# Patient Record
Sex: FEMALE | Race: WHITE | NOT HISPANIC OR LATINO | Employment: FULL TIME | URBAN - METROPOLITAN AREA
[De-identification: names, ages, dates, MRNs, and addresses within clinical notes are randomized per-mention and may not be internally consistent; named-entity substitution may affect disease eponyms.]

---

## 2017-03-02 ENCOUNTER — ALLSCRIPTS OFFICE VISIT (OUTPATIENT)
Dept: OTHER | Facility: OTHER | Age: 52
End: 2017-03-02

## 2017-05-01 ENCOUNTER — ALLSCRIPTS OFFICE VISIT (OUTPATIENT)
Dept: OTHER | Facility: OTHER | Age: 52
End: 2017-05-01

## 2017-05-04 LAB
ADDITIONAL INFORMATION (HISTORICAL): NORMAL
DIAGNOSIS (HISTORICAL): NORMAL
PATHOLOGIST (HISTORICAL): NORMAL
PROCEDURE (HISTORICAL): NORMAL
SITE/SOURCE (HISTORICAL): NORMAL

## 2017-05-05 ENCOUNTER — GENERIC CONVERSION - ENCOUNTER (OUTPATIENT)
Dept: OTHER | Facility: OTHER | Age: 52
End: 2017-05-05

## 2017-09-28 ENCOUNTER — GENERIC CONVERSION - ENCOUNTER (OUTPATIENT)
Dept: OTHER | Facility: OTHER | Age: 52
End: 2017-09-28

## 2018-01-13 VITALS
SYSTOLIC BLOOD PRESSURE: 120 MMHG | BODY MASS INDEX: 31.86 KG/M2 | HEIGHT: 67 IN | WEIGHT: 203 LBS | DIASTOLIC BLOOD PRESSURE: 80 MMHG

## 2018-01-14 VITALS
SYSTOLIC BLOOD PRESSURE: 114 MMHG | DIASTOLIC BLOOD PRESSURE: 80 MMHG | WEIGHT: 200 LBS | BODY MASS INDEX: 31.39 KG/M2 | HEIGHT: 67 IN

## 2018-01-15 NOTE — RESULT NOTES
Verified Results  (Q) TISSUE PATHOLOGY [id number] 12:00AM Brianna العراقي     Test Name Result Flag Reference   CLINICAL INFORMATION      54year-old white female with vulvar pruritis   inadequately responsive to 2 5% hydrocortisone  Donnell Davison MD, (electronic signature)  Boarded in Anatomic and Clinical Pathology  Cytopathology   A SOURCE      Vulva anterior right labia majus, medial aspect   A PROCEDURE      None given   A GROSS DESCRIPTION      Vulva anterior right labium majus medial aspect  In formalin  Consists of a fragment of tan gray   skin appearing tissue; 0 5 x 0 3 x 0 1 cm  The   margins cannot definitively be identified  All 1   in 1  Gross exam(s) performed at: [address] [facility] [address], [address] [place] [id number]    : Faraz Huynh MD PHD   A DIAGNOSIS      - MILD CHRONIC VULVITIS WITH FOCAL FEATURES   CONSISTENT WITH LICHEN [address] [facility],  [id number]  COMMENT:   SPECIAL STAIN DOES NOT SHOW FUNGAL ORGANISMS

## 2019-03-26 ENCOUNTER — ANNUAL EXAM (OUTPATIENT)
Dept: OBGYN CLINIC | Facility: CLINIC | Age: 54
End: 2019-03-26
Payer: COMMERCIAL

## 2019-03-26 VITALS
HEIGHT: 68 IN | BODY MASS INDEX: 30.01 KG/M2 | DIASTOLIC BLOOD PRESSURE: 62 MMHG | WEIGHT: 198 LBS | SYSTOLIC BLOOD PRESSURE: 114 MMHG

## 2019-03-26 DIAGNOSIS — N94.9 VAGINAL BURNING: ICD-10-CM

## 2019-03-26 DIAGNOSIS — Z12.31 VISIT FOR SCREENING MAMMOGRAM: ICD-10-CM

## 2019-03-26 DIAGNOSIS — Z12.11 COLON CANCER SCREENING: ICD-10-CM

## 2019-03-26 DIAGNOSIS — Z01.419 ENCOUNTER FOR ANNUAL ROUTINE GYNECOLOGICAL EXAMINATION: Primary | ICD-10-CM

## 2019-03-26 DIAGNOSIS — N94.89 VULVAR BURNING: ICD-10-CM

## 2019-03-26 DIAGNOSIS — E66.9 OBESITY (BMI 30.0-34.9): ICD-10-CM

## 2019-03-26 PROBLEM — E66.811 OBESITY (BMI 30.0-34.9): Status: ACTIVE | Noted: 2019-03-26

## 2019-03-26 LAB — SL AMB POCT WET MOUNT: NORMAL

## 2019-03-26 PROCEDURE — S0612 ANNUAL GYNECOLOGICAL EXAMINA: HCPCS | Performed by: OBSTETRICS & GYNECOLOGY

## 2019-03-26 PROCEDURE — 87210 SMEAR WET MOUNT SALINE/INK: CPT | Performed by: OBSTETRICS & GYNECOLOGY

## 2019-03-26 RX ORDER — BUPROPION HYDROCHLORIDE 300 MG/1
300 TABLET ORAL
COMMUNITY

## 2019-03-26 NOTE — PROGRESS NOTES
Pt is a52 y o  [id number] ,menopausal, who presents for preventive care  Partner same ; lifetime  >5 partners         safe sexual practices followed:     yes   does feel safe in relationship: yes  Pt reports burning with intercourse and external itching for the last several weeks--concerned she has an infection  Reports she uses poise pads for urinary leakage and has to take off her underwear at night to feel better  Dairy: 2 glasses almond milk/day, multivitamin daily  Vitamin D: in multvitamin  Exercise: 3-4x/week  Colonoscopy: last 2 years ago, wnl, hemoccult given  Mammogram: 10/2018--wnl, rx for 2019 given  Pap: not indicated--benign hyst  DEXA: not indicated  safety at home: yes  tobacco use N  Past Medical History:   Diagnosis Date    Depression     Migraines     Rectocele 2015    Last Assessment & Plan:  Mild rectocele defect noted on rectovaginal exam during valsalva  Expectant management for now, patient agrees  Encouraged Kegel exercises, dietary and behavioral modification strategies, occasional splinting/manual support of perineum is ok in order to avoid the more worrisome excessive straining with bowel movements  Patient understands and agrees         Past Surgical History:   Procedure Laterality Date    COLONOSCOPY  2017    wnl, repeat    HYSTERECTOMY      MD CYSTOURETHROSCOPY N/A 10/31/2016    Procedure: CYSTOSCOPY;  Surgeon: Will Flores MD;  [facility];  Service: UroGynecology          [address] N/A 10/31/2016    Procedure: Ricky Adrian; BIOLOGICAL GRAFT;  Surgeon: Will Flores MD;  [facility];  Service: UroGynecology           MD SLING OPER STRES INCONTINENCE N/A 10/31/2016    Procedure: SINGLE INCISION SLING;  Surgeon: Will Flores MD;  [facility];  Service: UroGynecology           PUBOVAGINAL SLING      REPAIR RECTOCELE         Ob Hx:   OB History    Para Term  AB Living   2 2 2     2   SAB TAB Ectopic Multiple Live Births                  # Outcome Date GA Lbr Kyle/2nd Weight Sex Delivery Anes PTL Lv   2 Term            1 Term               Obstetric Comments   Menarche at 8years old  Current Outpatient Medications:     Omega-3 Fatty Acids (FISH OIL) 875 MG CAPS, , Disp: , Rfl:     b complex vitamins tablet, Take 1 tablet by mouth daily, Disp: , Rfl:     buPROPion (WELLBUTRIN XL) 300 mg 24 hr tablet, Take 300 mg by mouth, Disp: , Rfl:     hydrocortisone 2 5 % cream, Apply topically 2 (two) times a day, Disp: 30 g, Rfl: 0    Multiple Vitamin (MULTIVITAMIN) tablet, Take 1 tablet by mouth daily, Disp: , Rfl:     No Known Allergies    Social History     Socioeconomic History    Marital status: /Civil Union     Spouse name: None    Number of children: 2    Years of education: None    Highest education level: Some college, no degree   Occupational History    None   Social Needs    Financial resource strain: None    Food insecurity:     Worry: None     Inability: None    Transportation needs:     Medical: None     Non-medical: None   Tobacco Use    Smoking status: Never Smoker    Smokeless tobacco: Never Used   Substance and Sexual Activity    Alcohol use: Yes     Alcohol/week: 0 6 - 1 2 oz     Types: 1 - 2 Shots of liquor per week     Frequency: Monthly or less     Drinks per session: 1 or 2     Binge frequency: Never    Drug use: No    Sexual activity: Yes     Partners: Male     Birth control/protection: Surgical, Other     Comment: >5 lifetime partners   30 years     Lifestyle    Physical activity:     Days per week: None     Minutes per session: None    Stress: None   Relationships    Social connections:     Talks on phone: None     Gets together: None     Attends [employer] service: None     Active member of club or organization: None     Attends meetings of clubs or organizations: None     Relationship status: None    Intimate partner violence:     Fear of current or ex partner: None     Emotionally abused: None     Physically abused: None     Forced sexual activity: None   Other Topics Concern    None   Social History Narrative    Calcium intake: 2 glass almond milk  No yogurt  No cheese  Multivitamin  Exercise: 4x/wk, 40min-hour  Jewish: Jewish    Accepts blood products  Family History   Problem Relation Age of Onset    Breast cancer Maternal Aunt 61    Breast cancer Maternal Aunt 61    Hyperlipidemia Mother     Stroke Father         s/p traumatic accident    Diabetes Father     Hypertension Father     No Known Problems Sister     Heart attack Brother     Hypertension Brother     No Known Problems Brother     No Known Problems Brother     Colon cancer Neg Hx     Ovarian cancer Neg Hx        Blood pressure 114/62, height 5' 7 5" (1 715 m), weight 89 8 kg (198 lb)  and Body mass index is 30 55 kg/m²  Physical Exam   Constitutional: She is oriented to person, place, and time  She appears well-developed and well-nourished  HENT:   Head: Normocephalic and atraumatic  Eyes: Conjunctivae and EOM are normal    Neck: Normal range of motion  Neck supple  No tracheal deviation present  No thyromegaly present  Cardiovascular: Normal rate, regular rhythm and normal heart sounds  Pulmonary/Chest: Effort normal and breath sounds normal  No stridor  No respiratory distress  She has no wheezes  She has no rales  Abdominal: Soft  Bowel sounds are normal  She exhibits no distension and no mass  There is no tenderness  There is no rebound and no guarding  Musculoskeletal: Normal range of motion  She exhibits no edema or tenderness  Lymphadenopathy:     She has no cervical adenopathy  Neurological: She is alert and oriented to person, place, and time  Skin: Skin is warm  No rash noted  No erythema  Psychiatric: She has a normal mood and affect   Her behavior is normal  Judgment and thought content normal       Breasts: breasts appear normal, no suspicious masses, no skin or nipple changes or axillary nodes  Pelvic exam: VULVA: vulvar excoriation on right side, VAGINA: pink, flattening of rugae, CERVIX: surgically absent, UTERUS: surgically absent, vaginal cuff well healed, ADNEXA: normal adnexa in size, nontender and no masses, RECTAL: normal rectal, no masses, external hemorrhoids non-thrombosed  Wet Mount/KOH Results:  Bacteria: few  Clue cells:  minimal  Trichomonas: absent  Yeast (with or without hyphae): absent  PH: yellow/normal  KOH: negative      A/P:  Pt is a 48 y o  [id number] with       Diagnoses and all orders for this visit:    Encounter for annual routine gynecological examination    Visit for screening mammogram  -     Mammo screening bilateral w 3d & cad; Future    Colon cancer screening  -     Occult Blood, Fecal, IA; Future  -     Occult Blood, Fecal, IA    Obesity (BMI 30 0-34 9)  -advised of recommendation of BMI 19-25    Vulvar burning  -     POCT wet mount    Vaginal burning  -     POCT wet mount    Likely contact dermatitis from continuous pad wear and irritation from the urine in pad  Advised to try organic pads  Hydrocortisone rx given  If no improvement to follow up

## 2020-10-23 DIAGNOSIS — Z12.31 VISIT FOR SCREENING MAMMOGRAM: ICD-10-CM

## 2024-02-21 PROBLEM — Z12.11 COLON CANCER SCREENING: Status: RESOLVED | Noted: 2019-03-26 | Resolved: 2024-02-21

## 2024-02-21 PROBLEM — Z01.419 ENCOUNTER FOR ANNUAL ROUTINE GYNECOLOGICAL EXAMINATION: Status: RESOLVED | Noted: 2019-03-26 | Resolved: 2024-02-21

## 2025-02-20 ENCOUNTER — OFFICE VISIT (OUTPATIENT)
Dept: URGENT CARE | Facility: CLINIC | Age: 60
End: 2025-02-20
Payer: COMMERCIAL

## 2025-02-20 VITALS
DIASTOLIC BLOOD PRESSURE: 67 MMHG | TEMPERATURE: 102.7 F | WEIGHT: 205.8 LBS | BODY MASS INDEX: 32.3 KG/M2 | OXYGEN SATURATION: 97 % | SYSTOLIC BLOOD PRESSURE: 136 MMHG | HEART RATE: 97 BPM | RESPIRATION RATE: 16 BRPM | HEIGHT: 67 IN

## 2025-02-20 DIAGNOSIS — J11.1 FLU: Primary | ICD-10-CM

## 2025-02-20 DIAGNOSIS — R50.9 FEVER, UNSPECIFIED FEVER CAUSE: ICD-10-CM

## 2025-02-20 DIAGNOSIS — R05.1 ACUTE COUGH: ICD-10-CM

## 2025-02-20 PROCEDURE — 87636 SARSCOV2 & INF A&B AMP PRB: CPT

## 2025-02-20 PROCEDURE — 99203 OFFICE O/P NEW LOW 30 MIN: CPT

## 2025-02-20 RX ORDER — OSELTAMIVIR PHOSPHATE 75 MG/1
75 CAPSULE ORAL EVERY 12 HOURS SCHEDULED
Qty: 10 CAPSULE | Refills: 0 | Status: SHIPPED | OUTPATIENT
Start: 2025-02-20 | End: 2025-02-25

## 2025-02-20 RX ORDER — BENZONATATE 200 MG/1
200 CAPSULE ORAL 3 TIMES DAILY PRN
Qty: 20 CAPSULE | Refills: 0 | Status: SHIPPED | OUTPATIENT
Start: 2025-02-20

## 2025-02-20 NOTE — PATIENT INSTRUCTIONS
Saline nasal spray as often as needed or you can use your Stefania pot at home.  Flonase nasal spray 1 spray to each nostril daily  You can take either DayQuil NyQuil, TheraFlu or Mucinex in the blue box  Increase your fluids and rest  Tessalon Perles as needed for cough  Tylenol Motrin for fever or discomfort  Start the Tamiflu today  Your testing will take 24 hours to come back.

## 2025-02-20 NOTE — PROGRESS NOTES
[facility]        NAME: Shiela Boone is a 59 y.o. female  : 1965    MRN: [id number]  DATE: 2025  TIME: 11:52 AM    Assessment and Plan   Flu [J11.1]  1. Flu  oseltamivir (TAMIFLU) 75 mg capsule      2. Fever, unspecified fever cause  Covid/Flu- Office Collect Normal      3. Acute cough  oseltamivir (TAMIFLU) 75 mg capsule    benzonatate (TESSALON) 200 MG capsule            Patient Instructions   Saline nasal spray as often as needed or you can use your [place] pot at home.  Flonase nasal spray 1 spray to each nostril daily  You can take either DayQuil NyQuil, TheraFlu or Mucinex in the blue box  Increase your fluids and rest  Tessalon Perles as needed for cough  Tylenol Motrin for fever or discomfort  Start the Tamiflu today  Your testing will take 24 hours to come back.    Follow up with PCP in 3-5 days.  Proceed to  ER if symptoms worsen.    If tests have been performed at [facility], our office will contact you with results if changes need to be made to the care plan discussed with you at the visit.  You can review your full results on [facility].    Chief Complaint     Chief Complaint   Patient presents with    Fever     Started Tuesday tmax: 102.4, cough, body aches, denies other symptoms, concerned for flu, took an at home Covid test on Tuesday that came back negative, and she's been taking Nightquil for relief          History of Present Illness       This is a 59-year-old female who presents today with fever, , chills, body aches, cough, nasal drip congestion.  Symptoms started on Tuesday she did have a negative COVID test Tu night.  Patient states her  is also sick at home.  She denies any shortness of breath or chest pain.  Patient states she has been using a [place] pot for her sinuses which seems to help.  She has been taking Tylenol and Motrin for fever.  COVID flu test sent to the lab.  I did discuss with the patient that if she develops shortness of  breath or chest pain to go to the emergency room    Fever  Associated symptoms include chills, congestion, coughing, fatigue, headaches, myalgias and a sore throat. Pertinent negatives include no chest pain.       Review of Systems   Review of Systems   Constitutional:  Positive for chills and fatigue.   HENT:  Positive for congestion, postnasal drip and sore throat.    Respiratory:  Positive for cough. Negative for shortness of breath, wheezing and stridor.    Cardiovascular:  Negative for chest pain.   Gastrointestinal: Negative.    Genitourinary: Negative.    Musculoskeletal:  Positive for myalgias.   Neurological:  Positive for headaches.         Current Medications       Current Outpatient Medications:     benzonatate (TESSALON) 200 MG capsule, Take 1 capsule (200 mg total) by mouth 3 (three) times a day as needed for cough, Disp: 20 capsule, Rfl: 0    oseltamivir (TAMIFLU) 75 mg capsule, Take 1 capsule (75 mg total) by mouth every 12 (twelve) hours for 5 days, Disp: 10 capsule, Rfl: 0    b complex vitamins tablet, Take 1 tablet by mouth daily, Disp: , Rfl:     buPROPion (WELLBUTRIN XL) 300 mg 24 hr tablet, Take 300 mg by mouth, Disp: , Rfl:     hydrocortisone 2.5 % cream, Apply topically 2 (two) times a day, Disp: 30 g, Rfl: 0    Multiple Vitamin (MULTIVITAMIN) tablet, Take 1 tablet by mouth daily, Disp: , Rfl:     Omega-3 Fatty Acids (FISH OIL) 875 MG CAPS, , Disp: , Rfl:     Current Allergies     Allergies as of 02/20/2025    (No Known Allergies)            The following portions of the patient's history were reviewed and updated as appropriate: allergies, current medications, past family history, past medical history, past social history, past surgical history and problem list.     Past Medical History:   Diagnosis Date    Depression     Migraines     Rectocele 9/29/2015    Last Assessment & Plan:  Mild rectocele defect noted on rectovaginal exam during valsalva  Expectant management for now, patient  "agrees.  Encouraged Kegel exercises, dietary and behavioral modification strategies, occasional splinting/manual support of perineum is ok in order to avoid the more worrisome excessive straining with bowel movements.  Patient understands and agrees.       Past Surgical History:   Procedure Laterality Date    COLONOSCOPY  2017    wnl, repeat    HYSTERECTOMY      NE CYSTOURETHROSCOPY N/A 10/31/2016    Procedure: CYSTOSCOPY;  Surgeon: Bala Lynn MD;  [facility];  Service: UroGynecology           NE REPAIR RECTOCELE SEPARATE PROCEDURE N/A 10/31/2016    Procedure: RETROCELE REPAIR; BIOLOGICAL GRAFT;  Surgeon: Bala Lynn MD;  [facility];  Service: UroGynecology           NE SLING OPERATION STRESS INCONTINENCE N/A 10/31/2016    Procedure: SINGLE INCISION SLING;  Surgeon: Bala Lynn MD;  [facility];  Service: UroGynecology           PUBOVAGINAL SLING      REPAIR RECTOCELE         Family History   Problem Relation Age of Onset    Breast cancer Maternal Aunt 60    Breast cancer Maternal Aunt 60    Hyperlipidemia Mother     Stroke Father         s/p traumatic accident    Diabetes Father     Hypertension Father     No Known Problems Sister     Heart attack Brother     Hypertension Brother     No Known Problems Brother     No Known Problems Brother     Colon cancer Neg Hx     Ovarian cancer Neg Hx          Medications have been verified.        Objective   /67 (BP Location: Right arm, Patient Position: Sitting, Cuff Size: Large)   Pulse 97   Temp (!) 102.7 °F (39.3 °C) (Tympanic)   Resp 16   Ht 5' 7\" (1.702 m)   Wt 93.4 kg (205 lb 12.8 oz)   SpO2 97%   BMI 32.23 kg/m²   No LMP recorded.       Physical Exam     Physical Exam  Constitutional:       Appearance: Normal appearance.   HENT:      Head: Atraumatic.      Right Ear: Tympanic membrane, ear canal and external ear normal. There is no impacted cerumen.      Left Ear: Tympanic membrane, ear canal and external ear " normal. There is no impacted cerumen.      Nose: Congestion present.      Mouth/Throat:      Pharynx: Posterior oropharyngeal erythema present.   Eyes:      Conjunctiva/sclera: Conjunctivae normal.      Pupils: Pupils are equal, round, and reactive to light.   Cardiovascular:      Rate and Rhythm: Normal rate and regular rhythm.      Pulses: Normal pulses.      Heart sounds: Normal heart sounds.   Pulmonary:      Effort: Pulmonary effort is normal.      Breath sounds: Normal breath sounds.   Abdominal:      General: Abdomen is flat. Bowel sounds are normal.   Musculoskeletal:         General: Normal range of motion.   Lymphadenopathy:      Cervical: No cervical adenopathy.   Skin:     General: Skin is warm and dry.      Capillary Refill: Capillary refill takes less than 2 seconds.   Neurological:      General: No focal deficit present.      Mental Status: She is alert and oriented to person, place, and time.   Psychiatric:         Mood and Affect: Mood normal.         Thought Content: Thought content normal.         Judgment: Judgment normal.

## 2025-02-21 LAB
FLUAV RNA RESP QL NAA+PROBE: POSITIVE
FLUBV RNA RESP QL NAA+PROBE: NEGATIVE
SARS-COV-2 RNA RESP QL NAA+PROBE: NEGATIVE